# Patient Record
Sex: FEMALE | Race: WHITE
[De-identification: names, ages, dates, MRNs, and addresses within clinical notes are randomized per-mention and may not be internally consistent; named-entity substitution may affect disease eponyms.]

---

## 2017-02-27 ENCOUNTER — HOSPITAL ENCOUNTER (OUTPATIENT)
Dept: HOSPITAL 53 - M SMT | Age: 47
End: 2017-02-27
Attending: OBSTETRICS & GYNECOLOGY
Payer: COMMERCIAL

## 2017-02-27 DIAGNOSIS — R53.83: Primary | ICD-10-CM

## 2017-02-27 LAB — T4 FREE SERPL-MCNC: 1.06 NG/DL (ref 0.76–1.46)

## 2017-02-27 PROCEDURE — 84443 ASSAY THYROID STIM HORMONE: CPT

## 2017-02-27 PROCEDURE — 84439 ASSAY OF FREE THYROXINE: CPT

## 2017-02-27 PROCEDURE — 36415 COLL VENOUS BLD VENIPUNCTURE: CPT

## 2019-03-27 ENCOUNTER — HOSPITAL ENCOUNTER (OUTPATIENT)
Dept: HOSPITAL 53 - M LAB REF | Age: 49
End: 2019-03-27
Attending: OBSTETRICS & GYNECOLOGY
Payer: COMMERCIAL

## 2019-03-27 DIAGNOSIS — Z12.4: Primary | ICD-10-CM

## 2019-03-27 PROCEDURE — 87624 HPV HI-RISK TYP POOLED RSLT: CPT

## 2019-03-29 LAB — HPV LOW VOL RFLX: (no result)

## 2020-11-09 ENCOUNTER — HOSPITAL ENCOUNTER (OUTPATIENT)
Dept: HOSPITAL 53 - M OPP | Age: 50
Discharge: HOME | End: 2020-11-09
Attending: INTERNAL MEDICINE
Payer: COMMERCIAL

## 2020-11-09 VITALS — WEIGHT: 124 LBS | BODY MASS INDEX: 24.35 KG/M2 | HEIGHT: 60 IN

## 2020-11-09 VITALS — SYSTOLIC BLOOD PRESSURE: 112 MMHG | DIASTOLIC BLOOD PRESSURE: 66 MMHG

## 2020-11-09 DIAGNOSIS — G47.30: ICD-10-CM

## 2020-11-09 DIAGNOSIS — D12.7: ICD-10-CM

## 2020-11-09 DIAGNOSIS — Z80.0: ICD-10-CM

## 2020-11-09 DIAGNOSIS — Z12.11: Primary | ICD-10-CM

## 2020-11-09 DIAGNOSIS — K64.8: ICD-10-CM

## 2020-11-09 DIAGNOSIS — Z86.010: ICD-10-CM

## 2020-11-09 NOTE — ROOR
________________________________________________________________________________

Patient Name: Sadny South              Procedure Date: 11/9/2020 8:31 AM

MRN: [id number]                          Account Number: [id number]

YOB: 1970               Age: 50

Room: [facility]                            Gender: Female

Note Status: Finalized                 

________________________________________________________________________________

 

Procedure:           Colonoscopy

Indications:         Screening in patient at increased risk: Family history of 

                     1st-degree relative with colorectal cancer before age 60 

                     years, High risk colon cancer surveillance: Personal 

                     history of colonic polyps

Providers:           Jeanmarie Calloway MD

Referring MD:        BOOM CLEAYA DO

Requesting Provider: 

Medicines:           Monitored Anesthesia Care

Complications:       No immediate complications.

________________________________________________________________________________

Procedure:           Pre-Anesthesia Assessment:

                     - Prior to the procedure, a History and Physical was 

                     performed, and patient medications and allergies were 

                     reviewed. The patient is competent. The risks and 

                     benefits of the procedure and the sedation options and 

                     risks were discussed with the patient. All questions were 

                     answered and informed consent was obtained. Patient 

                     identification and proposed procedure were verified by 

                     the physician and the nurse in the procedure room. Mental 

                     Status Examination: alert and oriented. Airway 

                     Examination: normal oropharyngeal airway and neck 

                     mobility. Respiratory Examination: clear to auscultation. 

                     CV Examination: normal. Prophylactic Antibiotics: The 

                     patient does not require prophylactic antibiotics. Prior 

                     Anticoagulants: The patient has taken no previous 

                     anticoagulant or antiplatelet agents. ASA Grade 

                     Assessment: II - A patient with mild systemic disease. 

                     After reviewing the risks and benefits, the patient was 

                     deemed in satisfactory condition to undergo the 

                     procedure. The anesthesia plan was to use monitored 

                     anesthesia care (MAC). Immediately prior to 

                     administration of medications, the patient was 

                     re-assessed for adequacy to receive sedatives. The heart 

                     rate, respiratory rate, oxygen saturations, blood 

                     pressure, adequacy of pulmonary ventilation, and response 

                     to care were monitored throughout the procedure. The 

                     physical status of the patient was re-assessed after the 

                     procedure.

                     The Colonoscope was introduced through the anus and 

                     advanced to the terminal ileum, with identification of 

                     the appendiceal orifice and IC valve. The colonoscopy was 

                     performed without difficulty. The patient tolerated the 

                     procedure well. The quality of the bowel preparation was 

                     good. The terminal ileum, ileocecal valve, appendiceal 

                     orifice, and rectum were photographed. Scope insertion 

                     time was 2 minutes. Scope withdrawal time was 9 minutes. 

                     The total duration of the procedure was 12 minutes.

                                                                                

Findings:

     The perianal and digital rectal examinations were normal.

     The terminal ileum appeared normal.

     A 6 mm polyp was found in the sigmoid colon. The polyp was sessile. The 

     polyp was removed with a jumbo cold forceps. Resection and retrieval were 

     complete. Verification of patient identification for the specimen was 

     done by the physician and nurse using the patient's name, birth date and 

     medical record number. Estimated blood loss was minimal.

     Non-bleeding external and internal hemorrhoids were found during 

     retroflexion. The hemorrhoids were medium-sized.

                                                                                

Impression:          - The examined portion of the ileum was normal.

                     - One 6 mm polyp in the sigmoid colon, removed with a 

                     jumbo cold forceps. Resected and retrieved.

                     - Non-bleeding external and internal hemorrhoids.

Recommendation:      - Patient has a contact number available for emergencies. 

                     The signs and symptoms of potential delayed complications 

                     were discussed with the patient. Return to normal 

                     activities tomorrow. Written discharge instructions were 

                     provided to the patient.

                     - High fiber diet.

                     - Continue present medications.

                     - Await pathology results.

                     - Repeat colonoscopy in 5 years for surveillance based on 

                     pathology results and due to family history of colon 

                     cancer.

                     - Telephone GI clinic for pathology results in 2 weeks.

                     - Return to primary care physician.

                                                                                

 

Jeanmarie Calloway MD

_______________________

Jeanmarie Calloway MD

11/9/2020 9:15:03 AM

Electronically signed by Jeanmarie Calloway MD

Number of Addenda: 0

 

Note Initiated On: 11/9/2020 8:31 AM

Estimated Blood Loss:

     Estimated blood loss was minimal.